# Patient Record
Sex: MALE | Race: WHITE | NOT HISPANIC OR LATINO | Employment: OTHER | URBAN - METROPOLITAN AREA
[De-identification: names, ages, dates, MRNs, and addresses within clinical notes are randomized per-mention and may not be internally consistent; named-entity substitution may affect disease eponyms.]

---

## 2023-11-21 ENCOUNTER — APPOINTMENT (OUTPATIENT)
Dept: CARDIOLOGY | Facility: HOSPITAL | Age: 73
End: 2023-11-21
Payer: MEDICARE

## 2023-11-21 ENCOUNTER — HOSPITAL ENCOUNTER (OUTPATIENT)
Facility: HOSPITAL | Age: 73
Setting detail: OBSERVATION
LOS: 1 days | Discharge: HOME | End: 2023-11-22
Attending: EMERGENCY MEDICINE | Admitting: STUDENT IN AN ORGANIZED HEALTH CARE EDUCATION/TRAINING PROGRAM
Payer: MEDICARE

## 2023-11-21 ENCOUNTER — APPOINTMENT (OUTPATIENT)
Dept: RADIOLOGY | Facility: HOSPITAL | Age: 73
End: 2023-11-21
Payer: MEDICARE

## 2023-11-21 DIAGNOSIS — K92.2 GI BLEED DUE TO NSAIDS: ICD-10-CM

## 2023-11-21 DIAGNOSIS — K92.2 ACUTE UPPER GI BLEED: Primary | ICD-10-CM

## 2023-11-21 DIAGNOSIS — T39.395A GI BLEED DUE TO NSAIDS: ICD-10-CM

## 2023-11-21 LAB
ALBUMIN SERPL BCP-MCNC: 4 G/DL (ref 3.4–5)
ALP SERPL-CCNC: 53 U/L (ref 33–136)
ALT SERPL W P-5'-P-CCNC: 47 U/L (ref 10–52)
ANION GAP SERPL CALC-SCNC: 13 MMOL/L (ref 10–20)
AST SERPL W P-5'-P-CCNC: 29 U/L (ref 9–39)
BASOPHILS # BLD AUTO: 0.02 X10*3/UL (ref 0–0.1)
BASOPHILS NFR BLD AUTO: 0.1 %
BILIRUB DIRECT SERPL-MCNC: 0.1 MG/DL (ref 0–0.3)
BILIRUB SERPL-MCNC: 0.7 MG/DL (ref 0–1.2)
BUN SERPL-MCNC: 27 MG/DL (ref 6–23)
CALCIUM SERPL-MCNC: 8.8 MG/DL (ref 8.6–10.3)
CHLORIDE SERPL-SCNC: 103 MMOL/L (ref 98–107)
CO2 SERPL-SCNC: 23 MMOL/L (ref 21–32)
CREAT SERPL-MCNC: 1.13 MG/DL (ref 0.5–1.3)
EOSINOPHIL # BLD AUTO: 0.13 X10*3/UL (ref 0–0.4)
EOSINOPHIL NFR BLD AUTO: 0.9 %
ERYTHROCYTE [DISTWIDTH] IN BLOOD BY AUTOMATED COUNT: 12.9 % (ref 11.5–14.5)
GFR SERPL CREATININE-BSD FRML MDRD: 68 ML/MIN/1.73M*2
GLUCOSE SERPL-MCNC: 131 MG/DL (ref 74–99)
HCT VFR BLD AUTO: 50.2 % (ref 41–52)
HGB BLD-MCNC: 17.3 G/DL (ref 13.5–17.5)
IMM GRANULOCYTES # BLD AUTO: 0.05 X10*3/UL (ref 0–0.5)
IMM GRANULOCYTES NFR BLD AUTO: 0.4 % (ref 0–0.9)
LACTATE SERPL-SCNC: 1.2 MMOL/L (ref 0.4–2)
LIPASE SERPL-CCNC: 35 U/L (ref 9–82)
LYMPHOCYTES # BLD AUTO: 0.52 X10*3/UL (ref 0.8–3)
LYMPHOCYTES NFR BLD AUTO: 3.7 %
MCH RBC QN AUTO: 32.3 PG (ref 26–34)
MCHC RBC AUTO-ENTMCNC: 34.5 G/DL (ref 32–36)
MCV RBC AUTO: 94 FL (ref 80–100)
MONOCYTES # BLD AUTO: 0.78 X10*3/UL (ref 0.05–0.8)
MONOCYTES NFR BLD AUTO: 5.5 %
NEUTROPHILS # BLD AUTO: 12.56 X10*3/UL (ref 1.6–5.5)
NEUTROPHILS NFR BLD AUTO: 89.4 %
NRBC BLD-RTO: 0 /100 WBCS (ref 0–0)
PLATELET # BLD AUTO: 267 X10*3/UL (ref 150–450)
POTASSIUM SERPL-SCNC: 4.1 MMOL/L (ref 3.5–5.3)
PROT SERPL-MCNC: 7.3 G/DL (ref 6.4–8.2)
RBC # BLD AUTO: 5.35 X10*6/UL (ref 4.5–5.9)
SODIUM SERPL-SCNC: 135 MMOL/L (ref 136–145)
WBC # BLD AUTO: 14.1 X10*3/UL (ref 4.4–11.3)

## 2023-11-21 PROCEDURE — 2550000001 HC RX 255 CONTRASTS: Performed by: EMERGENCY MEDICINE

## 2023-11-21 PROCEDURE — 93005 ELECTROCARDIOGRAM TRACING: CPT

## 2023-11-21 PROCEDURE — 80053 COMPREHEN METABOLIC PANEL: CPT | Performed by: EMERGENCY MEDICINE

## 2023-11-21 PROCEDURE — 85025 COMPLETE CBC W/AUTO DIFF WBC: CPT | Performed by: EMERGENCY MEDICINE

## 2023-11-21 PROCEDURE — 82248 BILIRUBIN DIRECT: CPT | Performed by: EMERGENCY MEDICINE

## 2023-11-21 PROCEDURE — 94760 N-INVAS EAR/PLS OXIMETRY 1: CPT

## 2023-11-21 PROCEDURE — 99222 1ST HOSP IP/OBS MODERATE 55: CPT | Performed by: STUDENT IN AN ORGANIZED HEALTH CARE EDUCATION/TRAINING PROGRAM

## 2023-11-21 PROCEDURE — 99285 EMERGENCY DEPT VISIT HI MDM: CPT | Mod: 25 | Performed by: EMERGENCY MEDICINE

## 2023-11-21 PROCEDURE — 96361 HYDRATE IV INFUSION ADD-ON: CPT | Performed by: STUDENT IN AN ORGANIZED HEALTH CARE EDUCATION/TRAINING PROGRAM

## 2023-11-21 PROCEDURE — 74174 CTA ABD&PLVS W/CONTRAST: CPT

## 2023-11-21 PROCEDURE — C9113 INJ PANTOPRAZOLE SODIUM, VIA: HCPCS | Performed by: EMERGENCY MEDICINE

## 2023-11-21 PROCEDURE — 1100000001 HC PRIVATE ROOM DAILY

## 2023-11-21 PROCEDURE — 83690 ASSAY OF LIPASE: CPT | Performed by: EMERGENCY MEDICINE

## 2023-11-21 PROCEDURE — 36415 COLL VENOUS BLD VENIPUNCTURE: CPT | Performed by: EMERGENCY MEDICINE

## 2023-11-21 PROCEDURE — 96374 THER/PROPH/DIAG INJ IV PUSH: CPT | Mod: 59 | Performed by: STUDENT IN AN ORGANIZED HEALTH CARE EDUCATION/TRAINING PROGRAM

## 2023-11-21 PROCEDURE — 2500000004 HC RX 250 GENERAL PHARMACY W/ HCPCS (ALT 636 FOR OP/ED): Performed by: EMERGENCY MEDICINE

## 2023-11-21 PROCEDURE — 74174 CTA ABD&PLVS W/CONTRAST: CPT | Performed by: RADIOLOGY

## 2023-11-21 PROCEDURE — 83605 ASSAY OF LACTIC ACID: CPT | Performed by: EMERGENCY MEDICINE

## 2023-11-21 RX ORDER — LEVOTHYROXINE SODIUM 88 UG/1
88 TABLET ORAL
COMMUNITY
Start: 2023-10-03

## 2023-11-21 RX ORDER — PANTOPRAZOLE SODIUM 40 MG/10ML
40 INJECTION, POWDER, LYOPHILIZED, FOR SOLUTION INTRAVENOUS 2 TIMES DAILY
Status: DISCONTINUED | OUTPATIENT
Start: 2023-11-22 | End: 2023-11-22

## 2023-11-21 RX ORDER — CHOLECALCIFEROL (VITAMIN D3) 25 MCG
1000 TABLET ORAL
Status: DISCONTINUED | OUTPATIENT
Start: 2023-11-22 | End: 2023-11-22 | Stop reason: HOSPADM

## 2023-11-21 RX ORDER — LISINOPRIL 10 MG/1
10 TABLET ORAL DAILY
Status: DISCONTINUED | OUTPATIENT
Start: 2023-11-22 | End: 2023-11-22 | Stop reason: HOSPADM

## 2023-11-21 RX ORDER — VIT C/E/ZN/COPPR/LUTEIN/ZEAXAN 250MG-90MG
1000 CAPSULE ORAL
COMMUNITY

## 2023-11-21 RX ORDER — SODIUM CHLORIDE, SODIUM LACTATE, POTASSIUM CHLORIDE, CALCIUM CHLORIDE 600; 310; 30; 20 MG/100ML; MG/100ML; MG/100ML; MG/100ML
75 INJECTION, SOLUTION INTRAVENOUS CONTINUOUS
Status: DISCONTINUED | OUTPATIENT
Start: 2023-11-21 | End: 2023-11-22 | Stop reason: HOSPADM

## 2023-11-21 RX ORDER — PANTOPRAZOLE SODIUM 40 MG/10ML
80 INJECTION, POWDER, LYOPHILIZED, FOR SOLUTION INTRAVENOUS ONCE
Status: COMPLETED | OUTPATIENT
Start: 2023-11-21 | End: 2023-11-21

## 2023-11-21 RX ORDER — LANSOPRAZOLE 30 MG/1
1 CAPSULE, DELAYED RELEASE ORAL DAILY
COMMUNITY
Start: 2023-01-17 | End: 2023-11-22 | Stop reason: HOSPADM

## 2023-11-21 RX ORDER — ONDANSETRON HYDROCHLORIDE 2 MG/ML
4 INJECTION, SOLUTION INTRAVENOUS ONCE
Status: COMPLETED | OUTPATIENT
Start: 2023-11-21 | End: 2023-11-21

## 2023-11-21 RX ORDER — BACLOFEN 20 MG
1 TABLET ORAL NIGHTLY
COMMUNITY
Start: 2019-10-24

## 2023-11-21 RX ORDER — ASPIRIN 81 MG/1
81 TABLET ORAL 2 TIMES DAILY
COMMUNITY
Start: 2019-01-04

## 2023-11-21 RX ORDER — RAMIPRIL 5 MG/1
5 CAPSULE ORAL DAILY
COMMUNITY
Start: 2023-09-01 | End: 2023-11-30

## 2023-11-21 RX ORDER — SERTRALINE HYDROCHLORIDE 100 MG/1
1 TABLET, FILM COATED ORAL NIGHTLY
COMMUNITY
Start: 2022-11-28

## 2023-11-21 RX ORDER — LANOLIN ALCOHOL/MO/W.PET/CERES
400 CREAM (GRAM) TOPICAL NIGHTLY
Status: DISCONTINUED | OUTPATIENT
Start: 2023-11-21 | End: 2023-11-22 | Stop reason: HOSPADM

## 2023-11-21 RX ORDER — TAMSULOSIN HYDROCHLORIDE 0.4 MG/1
1 CAPSULE ORAL NIGHTLY
COMMUNITY
Start: 2023-10-03 | End: 2024-10-02

## 2023-11-21 RX ORDER — ACETAMINOPHEN 325 MG/1
650 TABLET ORAL EVERY 6 HOURS PRN
Status: DISCONTINUED | OUTPATIENT
Start: 2023-11-21 | End: 2023-11-22 | Stop reason: HOSPADM

## 2023-11-21 RX ORDER — ONDANSETRON 4 MG/1
4 TABLET, FILM COATED ORAL EVERY 8 HOURS PRN
Status: DISCONTINUED | OUTPATIENT
Start: 2023-11-21 | End: 2023-11-22 | Stop reason: HOSPADM

## 2023-11-21 RX ORDER — ONDANSETRON HYDROCHLORIDE 2 MG/ML
4 INJECTION, SOLUTION INTRAVENOUS EVERY 8 HOURS PRN
Status: DISCONTINUED | OUTPATIENT
Start: 2023-11-21 | End: 2023-11-22 | Stop reason: HOSPADM

## 2023-11-21 RX ORDER — SERTRALINE HYDROCHLORIDE 50 MG/1
100 TABLET, FILM COATED ORAL NIGHTLY
Status: DISCONTINUED | OUTPATIENT
Start: 2023-11-21 | End: 2023-11-22 | Stop reason: HOSPADM

## 2023-11-21 RX ORDER — LEVOTHYROXINE SODIUM 88 UG/1
88 TABLET ORAL
Status: DISCONTINUED | OUTPATIENT
Start: 2023-11-22 | End: 2023-11-22 | Stop reason: HOSPADM

## 2023-11-21 RX ORDER — TAMSULOSIN HYDROCHLORIDE 0.4 MG/1
0.4 CAPSULE ORAL NIGHTLY
Status: DISCONTINUED | OUTPATIENT
Start: 2023-11-21 | End: 2023-11-22 | Stop reason: HOSPADM

## 2023-11-21 RX ADMIN — ONDANSETRON 4 MG: 2 INJECTION INTRAMUSCULAR; INTRAVENOUS at 15:17

## 2023-11-21 RX ADMIN — IOHEXOL 100 ML: 350 INJECTION, SOLUTION INTRAVENOUS at 16:15

## 2023-11-21 RX ADMIN — SODIUM CHLORIDE 1000 ML: 9 INJECTION, SOLUTION INTRAVENOUS at 15:19

## 2023-11-21 RX ADMIN — PANTOPRAZOLE SODIUM 80 MG: 40 INJECTION, POWDER, FOR SOLUTION INTRAVENOUS at 15:17

## 2023-11-21 SDOH — SOCIAL STABILITY: SOCIAL INSECURITY: DOES ANYONE TRY TO KEEP YOU FROM HAVING/CONTACTING OTHER FRIENDS OR DOING THINGS OUTSIDE YOUR HOME?: NO

## 2023-11-21 SDOH — SOCIAL STABILITY: SOCIAL INSECURITY: DO YOU FEEL ANYONE HAS EXPLOITED OR TAKEN ADVANTAGE OF YOU FINANCIALLY OR OF YOUR PERSONAL PROPERTY?: NO

## 2023-11-21 SDOH — SOCIAL STABILITY: SOCIAL INSECURITY: DO YOU FEEL UNSAFE GOING BACK TO THE PLACE WHERE YOU ARE LIVING?: NO

## 2023-11-21 SDOH — SOCIAL STABILITY: SOCIAL INSECURITY: HAVE YOU HAD THOUGHTS OF HARMING ANYONE ELSE?: NO

## 2023-11-21 SDOH — SOCIAL STABILITY: SOCIAL INSECURITY: HAS ANYONE EVER THREATENED TO HURT YOUR FAMILY OR YOUR PETS?: NO

## 2023-11-21 SDOH — SOCIAL STABILITY: SOCIAL INSECURITY: ARE YOU OR HAVE YOU BEEN THREATENED OR ABUSED PHYSICALLY, EMOTIONALLY, OR SEXUALLY BY ANYONE?: NO

## 2023-11-21 SDOH — SOCIAL STABILITY: SOCIAL INSECURITY: ABUSE: ADULT

## 2023-11-21 SDOH — SOCIAL STABILITY: SOCIAL INSECURITY: WERE YOU ABLE TO COMPLETE ALL THE BEHAVIORAL HEALTH SCREENINGS?: YES

## 2023-11-21 SDOH — SOCIAL STABILITY: SOCIAL INSECURITY: ARE THERE ANY APPARENT SIGNS OF INJURIES/BEHAVIORS THAT COULD BE RELATED TO ABUSE/NEGLECT?: NO

## 2023-11-21 ASSESSMENT — COGNITIVE AND FUNCTIONAL STATUS - GENERAL
MOBILITY SCORE: 24
PATIENT BASELINE BEDBOUND: NO
DAILY ACTIVITIY SCORE: 24

## 2023-11-21 ASSESSMENT — LIFESTYLE VARIABLES
EVER HAD A DRINK FIRST THING IN THE MORNING TO STEADY YOUR NERVES TO GET RID OF A HANGOVER: NO
HOW OFTEN DO YOU HAVE A DRINK CONTAINING ALCOHOL: NEVER
REASON UNABLE TO ASSESS: NO
HOW OFTEN DO YOU HAVE 6 OR MORE DRINKS ON ONE OCCASION: NEVER
AUDIT-C TOTAL SCORE: 0
SUBSTANCE_ABUSE_PAST_12_MONTHS: NO
EVER FELT BAD OR GUILTY ABOUT YOUR DRINKING: NO
HAVE YOU EVER FELT YOU SHOULD CUT DOWN ON YOUR DRINKING: NO
PRESCIPTION_ABUSE_PAST_12_MONTHS: NO
AUDIT-C TOTAL SCORE: 0
HAVE PEOPLE ANNOYED YOU BY CRITICIZING YOUR DRINKING: NO
SKIP TO QUESTIONS 9-10: 1
HOW MANY STANDARD DRINKS CONTAINING ALCOHOL DO YOU HAVE ON A TYPICAL DAY: PATIENT DOES NOT DRINK

## 2023-11-21 ASSESSMENT — ACTIVITIES OF DAILY LIVING (ADL)
GROOMING: INDEPENDENT
WALKS IN HOME: INDEPENDENT
JUDGMENT_ADEQUATE_SAFELY_COMPLETE_DAILY_ACTIVITIES: YES
LACK_OF_TRANSPORTATION: NO
FEEDING YOURSELF: INDEPENDENT
HEARING - RIGHT EAR: HEARING AID
PATIENT'S MEMORY ADEQUATE TO SAFELY COMPLETE DAILY ACTIVITIES?: YES
TOILETING: INDEPENDENT
HEARING - LEFT EAR: HEARING AID
BATHING: INDEPENDENT
ADEQUATE_TO_COMPLETE_ADL: YES
DRESSING YOURSELF: INDEPENDENT

## 2023-11-21 ASSESSMENT — COLUMBIA-SUICIDE SEVERITY RATING SCALE - C-SSRS
1. IN THE PAST MONTH, HAVE YOU WISHED YOU WERE DEAD OR WISHED YOU COULD GO TO SLEEP AND NOT WAKE UP?: NO
6. HAVE YOU EVER DONE ANYTHING, STARTED TO DO ANYTHING, OR PREPARED TO DO ANYTHING TO END YOUR LIFE?: NO
2. HAVE YOU ACTUALLY HAD ANY THOUGHTS OF KILLING YOURSELF?: NO

## 2023-11-21 ASSESSMENT — PAIN SCALES - GENERAL: PAINLEVEL_OUTOF10: 0 - NO PAIN

## 2023-11-21 ASSESSMENT — PATIENT HEALTH QUESTIONNAIRE - PHQ9
SUM OF ALL RESPONSES TO PHQ9 QUESTIONS 1 & 2: 0
1. LITTLE INTEREST OR PLEASURE IN DOING THINGS: NOT AT ALL
2. FEELING DOWN, DEPRESSED OR HOPELESS: NOT AT ALL

## 2023-11-21 ASSESSMENT — PAIN - FUNCTIONAL ASSESSMENT: PAIN_FUNCTIONAL_ASSESSMENT: 0-10

## 2023-11-21 NOTE — ED PROVIDER NOTES
HPI   Chief Complaint   Patient presents with    Abdominal Pain     Pt report very dark stools and vomiting dark vomit x1 times       Patient is a 73-year-old male with history of hypertension on baby aspirin comes in with episodes of black tarry stools last night about 12-14 of them and then he said getting dizzy nauseous he vomited coffee grounds and comes in complaining of some abdominal discomfort.  No fever no chills.                          No data recorded                Patient History   No past medical history on file.  No past surgical history on file.  No family history on file.  Social History     Tobacco Use    Smoking status: Not on file    Smokeless tobacco: Not on file   Substance Use Topics    Alcohol use: Not on file    Drug use: Not on file       Physical Exam   ED Triage Vitals [11/21/23 1412]   Temp Heart Rate Resp BP   36.8 °C (98.2 °F) 93 -- (!) 176/95      SpO2 Temp src Heart Rate Source Patient Position   94 % -- -- --      BP Location FiO2 (%)     -- --       Physical Exam  Vitals and nursing note reviewed. Exam conducted with a chaperone present.   HENT:      Head: Normocephalic.   Eyes:      Extraocular Movements: Extraocular movements intact.   Abdominal:      Palpations: Abdomen is soft.      Tenderness: There is abdominal tenderness in the epigastric area.   Genitourinary:     Rectum: Guaiac result positive.      Comments: Black dark stool strongly guaiac positive chaperone nurse at the bedside  Neurological:      General: No focal deficit present.      Mental Status: He is alert.         ED Course & MDM   Diagnoses as of 11/21/23 3097   Acute upper GI bleed       Medical Decision Making  My differential diagnosis  acute GI bleed, acute peptic ulcer disease, acute gastritis  I ordered a CBC chemistries lipase CT scan of the abdomen pelvis to further work-up and management of the patient and also order IV Protonix and IV Zofran for the patient.  Further work-up and management be done  based upon the results of the test ordered  Labs Reviewed  CBC WITH AUTO DIFFERENTIAL - Abnormal     WBC                           14.1 (*)               nRBC                          0.0                    RBC                           5.35                   Hemoglobin                    17.3                   Hematocrit                    50.2                   MCV                           94                     MCH                           32.3                   MCHC                          34.5                   RDW                           12.9                   Platelets                     267                    Neutrophils %                 89.4                   Immature Granulocytes %, Automated   0.4                    Lymphocytes %                 3.7                    Monocytes %                   5.5                    Eosinophils %                 0.9                    Basophils %                   0.1                    Neutrophils Absolute          12.56 (*)               Immature Granulocytes Absolute, Au*   0.05                   Lymphocytes Absolute          0.52 (*)               Monocytes Absolute            0.78                   Eosinophils Absolute          0.13                   Basophils Absolute            0.02                BASIC METABOLIC PANEL - Abnormal     Glucose                       131 (*)                Sodium                        135 (*)                Potassium                     4.1                    Chloride                      103                    Bicarbonate                   23                     Anion Gap                     13                     Urea Nitrogen                 27 (*)                 Creatinine                    1.13                   eGFR                          68                     Calcium                       8.8                 LIPASE - Normal     Lipase                        35                       Narrative: Venipuncture  immediately after or during the administration of Metamizole may lead to falsely low results. Testing should be performed immediately prior to Metamizole dosing.  HEPATIC FUNCTION PANEL - Normal     Albumin                       4.0                    Bilirubin, Total              0.7                    Bilirubin, Direct             0.1                    Alkaline Phosphatase          53                     ALT                           47                     AST                           29                     Total Protein                 7.3                 LACTATE - Normal     CT angio abdomen pelvis w and or wo IV IV contrast    (Results Pending)      At this time patient CT scan is pending following which further disposition will be made by the incoming provider who has been signed out to the patient.        Procedure  ECG 12 lead    Performed by: Deni Wheat MD  Authorized by: Deni Wheat MD    ECG interpreted by ED Physician in the absence of a cardiologist: yes    Rate:     ECG rate:  81  Rhythm:     Rhythm: sinus rhythm    QRS:     QRS conduction: normal    ST segments:     ST segments:  Normal       Deni Wheat MD  11/21/23 6921

## 2023-11-22 ENCOUNTER — ANESTHESIA (OUTPATIENT)
Dept: GASTROENTEROLOGY | Facility: HOSPITAL | Age: 73
End: 2023-11-22
Payer: MEDICARE

## 2023-11-22 ENCOUNTER — APPOINTMENT (OUTPATIENT)
Dept: GASTROENTEROLOGY | Facility: HOSPITAL | Age: 73
End: 2023-11-22
Payer: MEDICARE

## 2023-11-22 ENCOUNTER — ANESTHESIA EVENT (OUTPATIENT)
Dept: GASTROENTEROLOGY | Facility: HOSPITAL | Age: 73
End: 2023-11-22
Payer: MEDICARE

## 2023-11-22 VITALS
TEMPERATURE: 96.4 F | SYSTOLIC BLOOD PRESSURE: 104 MMHG | WEIGHT: 241.62 LBS | RESPIRATION RATE: 18 BRPM | OXYGEN SATURATION: 93 % | HEART RATE: 56 BPM | DIASTOLIC BLOOD PRESSURE: 57 MMHG | BODY MASS INDEX: 32.73 KG/M2 | HEIGHT: 72 IN

## 2023-11-22 PROBLEM — I10 HTN (HYPERTENSION): Status: ACTIVE | Noted: 2023-11-22

## 2023-11-22 PROBLEM — F41.9 ANXIETY: Status: ACTIVE | Noted: 2023-11-22

## 2023-11-22 PROBLEM — E66.9 CLASS 1 OBESITY IN ADULT: Status: ACTIVE | Noted: 2023-11-22

## 2023-11-22 PROBLEM — E03.9 HYPOTHYROIDISM: Status: ACTIVE | Noted: 2023-11-22

## 2023-11-22 LAB
ANION GAP SERPL CALC-SCNC: 11 MMOL/L (ref 10–20)
BUN SERPL-MCNC: 22 MG/DL (ref 6–23)
CALCIUM SERPL-MCNC: 8.1 MG/DL (ref 8.6–10.3)
CHLORIDE SERPL-SCNC: 106 MMOL/L (ref 98–107)
CO2 SERPL-SCNC: 26 MMOL/L (ref 21–32)
CREAT SERPL-MCNC: 1.09 MG/DL (ref 0.5–1.3)
ERYTHROCYTE [DISTWIDTH] IN BLOOD BY AUTOMATED COUNT: 13 % (ref 11.5–14.5)
GFR SERPL CREATININE-BSD FRML MDRD: 71 ML/MIN/1.73M*2
GLUCOSE SERPL-MCNC: 110 MG/DL (ref 74–99)
HCT VFR BLD AUTO: 45.4 % (ref 41–52)
HGB BLD-MCNC: 15.4 G/DL (ref 13.5–17.5)
MCH RBC QN AUTO: 32.6 PG (ref 26–34)
MCHC RBC AUTO-ENTMCNC: 33.9 G/DL (ref 32–36)
MCV RBC AUTO: 96 FL (ref 80–100)
NRBC BLD-RTO: 0 /100 WBCS (ref 0–0)
PLATELET # BLD AUTO: 213 X10*3/UL (ref 150–450)
POTASSIUM SERPL-SCNC: 3.8 MMOL/L (ref 3.5–5.3)
RBC # BLD AUTO: 4.72 X10*6/UL (ref 4.5–5.9)
SODIUM SERPL-SCNC: 139 MMOL/L (ref 136–145)
WBC # BLD AUTO: 9.5 X10*3/UL (ref 4.4–11.3)

## 2023-11-22 PROCEDURE — 96376 TX/PRO/DX INJ SAME DRUG ADON: CPT | Mod: 59 | Performed by: STUDENT IN AN ORGANIZED HEALTH CARE EDUCATION/TRAINING PROGRAM

## 2023-11-22 PROCEDURE — 2500000004 HC RX 250 GENERAL PHARMACY W/ HCPCS (ALT 636 FOR OP/ED): Performed by: STUDENT IN AN ORGANIZED HEALTH CARE EDUCATION/TRAINING PROGRAM

## 2023-11-22 PROCEDURE — C9113 INJ PANTOPRAZOLE SODIUM, VIA: HCPCS | Performed by: STUDENT IN AN ORGANIZED HEALTH CARE EDUCATION/TRAINING PROGRAM

## 2023-11-22 PROCEDURE — G0378 HOSPITAL OBSERVATION PER HR: HCPCS

## 2023-11-22 PROCEDURE — 88305 TISSUE EXAM BY PATHOLOGIST: CPT | Performed by: PATHOLOGY

## 2023-11-22 PROCEDURE — 36415 COLL VENOUS BLD VENIPUNCTURE: CPT | Performed by: STUDENT IN AN ORGANIZED HEALTH CARE EDUCATION/TRAINING PROGRAM

## 2023-11-22 PROCEDURE — 2500000001 HC RX 250 WO HCPCS SELF ADMINISTERED DRUGS (ALT 637 FOR MEDICARE OP): Performed by: STUDENT IN AN ORGANIZED HEALTH CARE EDUCATION/TRAINING PROGRAM

## 2023-11-22 PROCEDURE — 88305 TISSUE EXAM BY PATHOLOGIST: CPT | Mod: TC,SUR,ELYLAB | Performed by: STUDENT IN AN ORGANIZED HEALTH CARE EDUCATION/TRAINING PROGRAM

## 2023-11-22 PROCEDURE — 43239 EGD BIOPSY SINGLE/MULTIPLE: CPT | Performed by: STUDENT IN AN ORGANIZED HEALTH CARE EDUCATION/TRAINING PROGRAM

## 2023-11-22 PROCEDURE — 3700000001 HC GENERAL ANESTHESIA TIME - INITIAL BASE CHARGE

## 2023-11-22 PROCEDURE — 3700000002 HC GENERAL ANESTHESIA TIME - EACH INCREMENTAL 1 MINUTE

## 2023-11-22 PROCEDURE — 2500000002 HC RX 250 W HCPCS SELF ADMINISTERED DRUGS (ALT 637 FOR MEDICARE OP, ALT 636 FOR OP/ED): Performed by: STUDENT IN AN ORGANIZED HEALTH CARE EDUCATION/TRAINING PROGRAM

## 2023-11-22 PROCEDURE — 85027 COMPLETE CBC AUTOMATED: CPT | Performed by: STUDENT IN AN ORGANIZED HEALTH CARE EDUCATION/TRAINING PROGRAM

## 2023-11-22 PROCEDURE — 80048 BASIC METABOLIC PNL TOTAL CA: CPT | Performed by: STUDENT IN AN ORGANIZED HEALTH CARE EDUCATION/TRAINING PROGRAM

## 2023-11-22 PROCEDURE — 2500000004 HC RX 250 GENERAL PHARMACY W/ HCPCS (ALT 636 FOR OP/ED): Performed by: ANESTHESIOLOGY

## 2023-11-22 PROCEDURE — 88305 TISSUE EXAM BY PATHOLOGIST: CPT | Mod: TC,ELYLAB | Performed by: STUDENT IN AN ORGANIZED HEALTH CARE EDUCATION/TRAINING PROGRAM

## 2023-11-22 PROCEDURE — 99239 HOSP IP/OBS DSCHRG MGMT >30: CPT | Performed by: STUDENT IN AN ORGANIZED HEALTH CARE EDUCATION/TRAINING PROGRAM

## 2023-11-22 RX ORDER — PANTOPRAZOLE SODIUM 40 MG/1
40 TABLET, DELAYED RELEASE ORAL
Qty: 60 TABLET | Refills: 0 | Status: SHIPPED | OUTPATIENT
Start: 2023-11-22 | End: 2023-12-22

## 2023-11-22 RX ORDER — PANTOPRAZOLE SODIUM 40 MG/1
40 TABLET, DELAYED RELEASE ORAL
Status: DISCONTINUED | OUTPATIENT
Start: 2023-11-22 | End: 2023-11-22 | Stop reason: HOSPADM

## 2023-11-22 RX ORDER — PROPOFOL 10 MG/ML
INJECTION, EMULSION INTRAVENOUS AS NEEDED
Status: DISCONTINUED | OUTPATIENT
Start: 2023-11-22 | End: 2023-11-22

## 2023-11-22 RX ADMIN — SERTRALINE HYDROCHLORIDE 100 MG: 50 TABLET, FILM COATED ORAL at 00:18

## 2023-11-22 RX ADMIN — TAMSULOSIN HYDROCHLORIDE 0.4 MG: 0.4 CAPSULE ORAL at 00:17

## 2023-11-22 RX ADMIN — Medication 1000 UNITS: at 06:21

## 2023-11-22 RX ADMIN — LISINOPRIL 10 MG: 10 TABLET ORAL at 09:17

## 2023-11-22 RX ADMIN — SODIUM CHLORIDE, POTASSIUM CHLORIDE, SODIUM LACTATE AND CALCIUM CHLORIDE 75 ML/HR: 600; 310; 30; 20 INJECTION, SOLUTION INTRAVENOUS at 00:16

## 2023-11-22 RX ADMIN — PROPOFOL 80 MG: 10 INJECTION, EMULSION INTRAVENOUS at 14:32

## 2023-11-22 RX ADMIN — LEVOTHYROXINE SODIUM 88 MCG: 0.09 TABLET ORAL at 06:21

## 2023-11-22 RX ADMIN — PANTOPRAZOLE SODIUM 40 MG: 40 INJECTION, POWDER, FOR SOLUTION INTRAVENOUS at 09:18

## 2023-11-22 SDOH — HEALTH STABILITY: MENTAL HEALTH: CURRENT SMOKER: 0

## 2023-11-22 ASSESSMENT — ENCOUNTER SYMPTOMS
DIARRHEA: 1
NAUSEA: 1
ABDOMINAL PAIN: 1

## 2023-11-22 ASSESSMENT — PAIN - FUNCTIONAL ASSESSMENT: PAIN_FUNCTIONAL_ASSESSMENT: 0-10

## 2023-11-22 ASSESSMENT — PAIN SCALES - GENERAL
PAINLEVEL_OUTOF10: 0 - NO PAIN
PAIN_LEVEL: 0

## 2023-11-22 NOTE — NURSING NOTE
Huddle and Timeout completed together with team. Patient wristband and ISAIAH information verified.

## 2023-11-22 NOTE — CONSULTS
Department of Internal Medicine  Gastroenterology  Consult note      Reason for Consult: Possible acute GIB    Chief Complaint: Diarrhea, possible melena and coffee-ground emesis    History Obtained from: Patient prior medical records    History of Present Illness      The patient is a 73 y.o. male with signficant past medical history of anxiety, enlarged prostate and hypertension who presents for diarrhea, possible melena and coffee-ground emesis.    The patient reports having diarrhea described as loose or black stools 2 nights ago greater than 12 episodes with some dizziness.  Patient reports he took some Dramamine and after that had an episode of vomiting described as black/coffee-ground x 1.  Diarrhea has seemed to be improving with only 2 bowel movements yesterday noted to be normal in color and more solid per patient.  No further bowel movements overnight or today.  No further episodes of nausea or vomiting.  No abdominal pain.  No current dizziness.  No chest pain or shortness of breath.  Patient denies bright red blood per rectum.  Patient reports taking NSAIDs, ibuprofen, maybe a few times a week.  No history of liver disease.  Patient takes a baby ASA, no other anticoagulants at home.  Patient reports taking Prilosec with relief of reflux.    Patient reports that his wife was sick with similar symptoms a few days prior with diarrhea.  Patient was also recently on an airplane and reports eating fast food, abdominal cheeseburger and diet Coke as well prior to symptoms.    No prior EGD    Prior colonoscopy indicated for GIB about 9 years ago at a  facility and [facility] with unremarkable results reportedly per patient    Allergies  Codeine and Hydrocodone-acetaminophen    Current Medication    Current Facility-Administered Medications:     acetaminophen (Tylenol) tablet 650 mg, 650 mg, oral, q6h PRN, Victor M Hayes MD    cholecalciferol (Vitamin D-3) tablet 1,000 Units, 1,000 Units, oral, Daily, Victor M  MD Abigail, 1,000 Units at 11/22/23 0621    lactated Ringer's infusion, 75 mL/hr, intravenous, Continuous, Victor M Hayes MD, Last Rate: 75 mL/hr at 11/22/23 0240, 75 mL/hr at 11/22/23 0240    levothyroxine (Synthroid, Levoxyl) tablet 88 mcg, 88 mcg, oral, Daily before breakfast, Victor M Hayes MD, 88 mcg at 11/22/23 0621    lisinopril tablet 10 mg, 10 mg, oral, Daily, Victor M Hayes MD, 10 mg at 11/22/23 0917    magnesium oxide (Mag-Ox) tablet 400 mg, 400 mg, oral, Nightly, Victor M Hayes MD    ondansetron (Zofran) tablet 4 mg, 4 mg, oral, q8h PRN **OR** ondansetron (Zofran) injection 4 mg, 4 mg, intravenous, q8h PRN, Victor M Hayes MD    pantoprazole (ProtoNix) injection 40 mg, 40 mg, intravenous, BID, Victor M Hayes MD, 40 mg at 11/22/23 0918    sertraline (Zoloft) tablet 100 mg, 100 mg, oral, Nightly, Victor M Hayes MD, 100 mg at 11/22/23 0018    tamsulosin (Flomax) 24 hr capsule 0.4 mg, 0.4 mg, oral, Nightly, Victor M Hayes MD, 0.4 mg at 11/22/23 0017    Past Medical History  Active Ambulatory Problems     Diagnosis Date Noted    HTN (hypertension) 11/22/2023    Anxiety 11/22/2023    Hypothyroidism 11/22/2023    Class 1 obesity in adult 11/22/2023     Resolved Ambulatory Problems     Diagnosis Date Noted    No Resolved Ambulatory Problems     Past Medical History:   Diagnosis Date    Enlarged prostate     Hypertension        Past Surgical History  Past Surgical History:   Procedure Laterality Date    CT ABDOMEN PELVIS ANGIOGRAM W AND/OR WO IV CONTRAST  11/21/2023    CT ABDOMEN PELVIS ANGIOGRAM W AND/OR WO IV CONTRAST 11/21/2023 [facility]    KNEE SURGERY Left     miniscus tear    TOTAL HIP ARTHROPLASTY Right        Family History  No family history on file.  No family history of stomach or colon cancer    Social History  TOBACCO:  reports that he has never smoked. He has never used smokeless tobacco.  ETOH:  reports no history of alcohol use.  DRUGS:  reports no history of drug  "use.  MARITAL STATUS:   OCCUPATION:    Review of Systems  All 10 systems reviewed with the patient/family and negative except for what is mentioned in HPI      PHYSICAL EXAM  VS: /60   Pulse 72   Temp 36.3 °C (97.3 °F)   Resp 18   Ht 1.829 m (6' 0.01\")   Wt 110 kg (241 lb 10 oz)   SpO2 97%   BMI 32.76 kg/m²  Body mass index is 32.76 kg/m².  Physical Exam  Vitals reviewed.   Constitutional:       General: He is not in acute distress.     Appearance: Normal appearance.   HENT:      Head: Normocephalic.      Nose: Nose normal.      Mouth/Throat:      Mouth: Mucous membranes are moist.      Pharynx: Oropharynx is clear.   Eyes:      Extraocular Movements: Extraocular movements intact.      Conjunctiva/sclera: Conjunctivae normal.      Pupils: Pupils are equal, round, and reactive to light.   Cardiovascular:      Rate and Rhythm: Normal rate and regular rhythm.      Pulses: Normal pulses.      Heart sounds: Normal heart sounds.   Pulmonary:      Effort: Pulmonary effort is normal.      Breath sounds: Normal breath sounds.   Abdominal:      General: Abdomen is flat. Bowel sounds are normal. There is no distension.      Palpations: Abdomen is soft.      Tenderness: There is no abdominal tenderness. There is no guarding or rebound.      Comments: PAPA: No stool in the rectal vault   Musculoskeletal:         General: Normal range of motion.      Cervical back: Normal range of motion.   Skin:     General: Skin is warm and dry.   Neurological:      General: No focal deficit present.      Mental Status: He is alert and oriented to person, place, and time.   Psychiatric:         Mood and Affect: Mood normal.         Behavior: Behavior normal.          DATA  Recent blood work and relevant radiology and endoscopic studies were reviewed and discussed with the patient   Results from last 7 days   Lab Units 11/22/23  0448   WBC AUTO x10*3/uL 9.5   RBC AUTO x10*6/uL 4.72   HEMOGLOBIN g/dL 15.4   HEMATOCRIT % 45.4   MCV fL " 96   MCHC g/dL 33.9   RDW % 13.0   PLATELETS AUTO x10*3/uL 213       Results from last 72 hours   Lab Units 11/22/23  0448 11/21/23  1433   SODIUM mmol/L 139 135*   POTASSIUM mmol/L 3.8 4.1   CHLORIDE mmol/L 106 103   CO2 mmol/L 26 23   BUN mg/dL 22 27*   CREATININE mg/dL 1.09 1.13   CALCIUM mg/dL 8.1* 8.8   PROTEIN TOTAL g/dL  --  7.3   BILIRUBIN TOTAL mg/dL  --  0.7   ALK PHOS U/L  --  53   AST U/L  --  29   ALT U/L  --  47             Results from last 72 hours   Lab Units 11/21/23  1433   LIPASE U/L 35       RADIOLOGY REVIEW  CT abdomen pelvis angiogram 11/21/2023:  IMPRESSION:  No evidence of extravasation contrast in bowel lumen to suggest acute  active gastrointestinal bleed.      Diffuse fluid-filled colon compatible with diarrhea/colitis.      Colonic diverticulosis without evidence of acute diverticulitis.      Mild groundglass opacity in the mid abdominal mesentery and several  subcentimeter mesenteric lymph nodes which may be secondary to mild  mesenteric panniculitis or reactive to an inflammatory process of the  bowel.      Large hiatal hernia.    ENDOSCOPIC REVIEW  No prior EGD    Prior colonoscopy indicated for GIB about 9 years ago at a  facility and [facility] with unremarkable results reportedly per patient      IMPRESSION/RECOMMENDATIONS  The patient is a 73 y.o. male with signficant past medical history of anxiety, enlarged prostate and hypertension who presents for diarrhea, possible melena and coffee-ground emesis.    The patient reports having diarrhea described as loose or black stools 2 nights ago greater than 12 episodes with some dizziness.  Patient reports he took some Dramamine and after that had an episode of vomiting described as black/coffee-ground x 1.  Diarrhea has seemed to be improving with only 2 bowel movements yesterday noted to be normal in color and more solid per patient.  No further bowel movements overnight or today.  No further episodes of nausea or vomiting.  No  abdominal pain.  No current dizziness.  No chest pain or shortness of breath.  Patient denies bright red blood per rectum.  Patient reports taking NSAIDs, ibuprofen, maybe a few times a week.  No history of liver disease.  Patient takes a baby ASA, no other anticoagulants at home.  Patient reports taking Prilosec with relief of reflux.          Assessment  #Questionable GIB- ?Melena and coffee-ground emesis x1, with no further episodes.  No current anemia.  HDS.  Takes NSAIDs maybe a few times a week  #N/V/D -probably secondary to acute viral gastroenteritis that has seemed to subside with patient reporting he's back to his baseline.  Wife sick with similar symptoms.      Plan  -EGD today  -Keep n.p.o. prior to procedure  -Given PPI 80 mg bolus, continue PPI twice daily  -trend hgb and hct  -monitor consistency and color of stool. Monitor for signs of overt GI bleeding  -tranfuse for hgb < 7.0  -avoid NSAIDs  -Continue supportive care  -Consider checking for C. difficile and stool pathogen panel if diarrhea returns          Addendum  EGD today 11/22/2023:  Findings  The esophagus appeared normal.  10 cm hiatal hernia with Felipe lesions present. Hill grade IV hiatal hernia  Erythematous mucosa in the stomach; performed cold forceps biopsy to rule out H. pylori  The duodenum appeared normal.        Recommendation    Await pathology results     PPI BID.   Avoid NSAIDs.  If patient develops anemia or worsening bleeding, would place referral to Surgery for repair of hiatal hernia.  Antireflux lifestyle.       Plan discussed with Dr. Kramer.  GI will sign off  Total of 75 minutes spent on visit and overall personal care of the patient including patient education.  All questions answered  (Electronically signed byFRANCISCO Mortensen on 11/22/2023 at 12:45 PM)                           Inpatient consult to gastroenterology  Consult performed by: FRANCISCO Mortensen  Consult ordered by: Victor M Hayes  MD

## 2023-11-22 NOTE — PROGRESS NOTES
Emergency Medicine Transition of Care Note.    I received Ralf Garcia in signout from Dr. Wheat.  Please see the previous ED provider note for all HPI, PE and MDM up to the time of signout at 1700. This is in addition to the primary record.    In brief Ralf Garcia is an 73 y.o. male presenting for   Chief Complaint   Patient presents with    Abdominal Pain     Pt report very dark stools and vomiting dark vomit x1 times     At the time of signout we were awaiting: CT angio abdomen    Diagnoses as of 11/21/23 2036   Acute upper GI bleed       Medical Decision Making  Patient is an overall well-appearing 73-year-old male presents to the emergency department for dark stools.  Patient evaluated by the previous ED provider and confirmed for upper GI bleed.  Patient was treated for this with Protonix patient signed out to me pending CT angio.  Patient's labs were reviewed.  No anemia noted.  Patient's BUN is elevated without any previous labs for comparison.  Kidney function is at the upper end of normal.  Minor leukocytosis with hemoglobin of 14.1 noted.  No apparent infectious etiologies.  CT angio reviewed without any evidence of extravasation of contrast indicated a acute GI bleed.  Patient does have fluid-filled colon compatible with his complaints of diarrhea as he has had multiple episodes of dark stools.  No evidence of diverticulitis.  There is some enlarged subcentimeter mesenteric lymph nodes of undetermined significance as the patient on my evaluation had no abdominal discomfort.  Patient does admit that he utilizes 2 to 3 tablets of NSAIDs up to 4 times a day 2-3 times a week for general aches and pains.  I suspect that the patient has gastric ulcer secondary to NSAID use which is what is causing his upper GI bleed.  Hospitalist was contacted and the case discussed.  Patient was accepted for admission.  Patient family at bedside were advised of all of his laboratory and imaging findings and current  care plan and he is in agreement with this.  All questions answered.  Patient admitted without incident.  Patient remained stable while in the emergency department.    Labs Reviewed   CBC WITH AUTO DIFFERENTIAL - Abnormal       Result Value    WBC 14.1 (*)     nRBC 0.0      RBC 5.35      Hemoglobin 17.3      Hematocrit 50.2      MCV 94      MCH 32.3      MCHC 34.5      RDW 12.9      Platelets 267      Neutrophils % 89.4      Immature Granulocytes %, Automated 0.4      Lymphocytes % 3.7      Monocytes % 5.5      Eosinophils % 0.9      Basophils % 0.1      Neutrophils Absolute 12.56 (*)     Immature Granulocytes Absolute, Automated 0.05      Lymphocytes Absolute 0.52 (*)     Monocytes Absolute 0.78      Eosinophils Absolute 0.13      Basophils Absolute 0.02     BASIC METABOLIC PANEL - Abnormal    Glucose 131 (*)     Sodium 135 (*)     Potassium 4.1      Chloride 103      Bicarbonate 23      Anion Gap 13      Urea Nitrogen 27 (*)     Creatinine 1.13      eGFR 68      Calcium 8.8     LIPASE - Normal    Lipase 35      Narrative:     Venipuncture immediately after or during the administration of Metamizole may lead to falsely low results. Testing should be performed immediately prior to Metamizole dosing.   HEPATIC FUNCTION PANEL - Normal    Albumin 4.0      Bilirubin, Total 0.7      Bilirubin, Direct 0.1      Alkaline Phosphatase 53      ALT 47      AST 29      Total Protein 7.3     LACTATE - Normal    Lactate 1.2      Narrative:     Venipuncture immediately after or during the administration of Metamizole may lead to falsely low results. Testing should be performed immediately  prior to Metamizole dosing.     CT angio abdomen pelvis w and or wo IV IV contrast   Final Result   No evidence of extravasation contrast in bowel lumen to suggest acute   active gastrointestinal bleed.        Diffuse fluid-filled colon compatible with diarrhea/colitis.        Colonic diverticulosis without evidence of acute diverticulitis.         Mild groundglass opacity in the mid abdominal mesentery and several   subcentimeter mesenteric lymph nodes which may be secondary to mild   mesenteric panniculitis or reactive to an inflammatory process of the   bowel.        Large hiatal hernia.        MACRO:   None        Signed by: Nestor Azul 11/21/2023 7:11 PM   Dictation workstation:   [id number]          Final diagnoses:   [K92.2] Acute upper GI bleed       Procedure  Procedures    Vipin Mancilla DO

## 2023-11-22 NOTE — H&P
History Of Present Illness  Ralf Garcia is a 73 y.o. male presenting with diarrhea and black tarry stools for 1 day. Patient reports mild abdominal pain and several episodes of diarrhea. He states stool was black and tarry. Patient also reports nausea and 1 episode of dark vomitus. He denies similar prior episodes, and denies fever/chills, CP, SOB. Patient reports last colonoscopy approximately 9 years ago, and denies abnormal findings of previous scopes. Patient does report taking NSAIDs several times a week for back pain. In the ED, patient was afebrile and hemodynamically stable. Labs significant for mildly elevated BUN and leukocytosis. FOBT +. Patient received IVF and IV protonix. He reports last BM in the ED was brown.        Past Medical History  He has a past medical history of Anxiety, Enlarged prostate, and Hypertension.    Surgical History  He has a past surgical history that includes CT angio abdomen pelvis w and or wo IV IV contrast (11/21/2023); Total hip arthroplasty (Right); and Knee surgery (Left).     Social History  He reports that he has never smoked. He has never used smokeless tobacco. He reports that he does not drink alcohol and does not use drugs.    Family History  No family history on file.     Allergies  Codeine and Hydrocodone-acetaminophen    Review of Systems   Gastrointestinal:  Positive for abdominal pain, diarrhea and nausea.   All other systems reviewed and are negative.       Physical Exam  General Appearance: awake and alert, AAOx3, NAD  HEENT: nc/at, eomi, perrla, dry oral mucosa  Resp: ctab; no wheezing, rhonchi, or rales, normal respiratory effort  Cardio: rrr. S1s2  GI: soft, ntnd, BS+  Ext: no edema, 2+ pulses b/l     Last Recorded Vitals  /70   Pulse 75   Temp 36.4 °C (97.5 °F)   Resp 18   Wt 110 kg (241 lb 10 oz)   SpO2 92%     Relevant Results  Results for orders placed or performed during the hospital encounter of 11/21/23 (from the past 24 hour(s))   CBC  and Auto Differential   Result Value Ref Range    WBC 14.1 (H) 4.4 - 11.3 x10*3/uL    nRBC 0.0 0.0 - 0.0 /100 WBCs    RBC 5.35 4.50 - 5.90 x10*6/uL    Hemoglobin 17.3 13.5 - 17.5 g/dL    Hematocrit 50.2 41.0 - 52.0 %    MCV 94 80 - 100 fL    MCH 32.3 26.0 - 34.0 pg    MCHC 34.5 32.0 - 36.0 g/dL    RDW 12.9 11.5 - 14.5 %    Platelets 267 150 - 450 x10*3/uL    Neutrophils % 89.4 40.0 - 80.0 %    Immature Granulocytes %, Automated 0.4 0.0 - 0.9 %    Lymphocytes % 3.7 13.0 - 44.0 %    Monocytes % 5.5 2.0 - 10.0 %    Eosinophils % 0.9 0.0 - 6.0 %    Basophils % 0.1 0.0 - 2.0 %    Neutrophils Absolute 12.56 (H) 1.60 - 5.50 x10*3/uL    Immature Granulocytes Absolute, Automated 0.05 0.00 - 0.50 x10*3/uL    Lymphocytes Absolute 0.52 (L) 0.80 - 3.00 x10*3/uL    Monocytes Absolute 0.78 0.05 - 0.80 x10*3/uL    Eosinophils Absolute 0.13 0.00 - 0.40 x10*3/uL    Basophils Absolute 0.02 0.00 - 0.10 x10*3/uL   Basic metabolic panel   Result Value Ref Range    Glucose 131 (H) 74 - 99 mg/dL    Sodium 135 (L) 136 - 145 mmol/L    Potassium 4.1 3.5 - 5.3 mmol/L    Chloride 103 98 - 107 mmol/L    Bicarbonate 23 21 - 32 mmol/L    Anion Gap 13 10 - 20 mmol/L    Urea Nitrogen 27 (H) 6 - 23 mg/dL    Creatinine 1.13 0.50 - 1.30 mg/dL    eGFR 68 >60 mL/min/1.73m*2    Calcium 8.8 8.6 - 10.3 mg/dL   Lipase   Result Value Ref Range    Lipase 35 9 - 82 U/L   Hepatic function panel   Result Value Ref Range    Albumin 4.0 3.4 - 5.0 g/dL    Bilirubin, Total 0.7 0.0 - 1.2 mg/dL    Bilirubin, Direct 0.1 0.0 - 0.3 mg/dL    Alkaline Phosphatase 53 33 - 136 U/L    ALT 47 10 - 52 U/L    AST 29 9 - 39 U/L    Total Protein 7.3 6.4 - 8.2 g/dL   Lactate   Result Value Ref Range    Lactate 1.2 0.4 - 2.0 mmol/L   CBC   Result Value Ref Range    WBC 9.5 4.4 - 11.3 x10*3/uL    nRBC 0.0 0.0 - 0.0 /100 WBCs    RBC 4.72 4.50 - 5.90 x10*6/uL    Hemoglobin 15.4 13.5 - 17.5 g/dL    Hematocrit 45.4 41.0 - 52.0 %    MCV 96 80 - 100 fL    MCH 32.6 26.0 - 34.0 pg    MCHC  33.9 32.0 - 36.0 g/dL    RDW 13.0 11.5 - 14.5 %    Platelets 213 150 - 450 x10*3/uL     CT angio abdomen pelvis w and or wo IV IV contrast    Result Date: 11/21/2023  Interpreted By:  Nestor Azul,  and Giles Shelton STUDY: CT ANGIO ABDOMEN PELVIS W AND/OR WO IV IV CONTRAST;  11/21/2023 4:42 pm   INDICATION: Signs/Symptoms:gi bleed and abd pain.   COMPARISON: None.   ACCESSION NUMBER(S): [id number]   ORDERING CLINICIAN: ELAYNE VALDES   TECHNIQUE: Contiguous axial images of the abdomen and pelvis were obtained with and without intravenous contrast. Coronal and sagittal reformatted images were obtained from the axial images. MIPS and 3D reformatted images were also performed and reviewed.   FINDINGS: There is limited evaluation of the lung bases. Large hiatal hernia. Bibasilar subsegmental atelectasis.   There is hepatic steatosis. Two small subcentimeter hypodensities in the liver are too small to characterize. The gallbladder is present. No dilatation of the common bile duct.   The pancreas, spleen, and adrenal glands appear unremarkable.   Symmetric enhancement of the kidneys. 4 mm nonobstructive left renal calculus. Small right renal cysts and subcentimeter hypodensity left kidney too small to characterize. No hydronephrosis.   No evidence of abdominal aortic aneurysm or dissection. There is atherosclerotic calcification of the abdominal aorta and bilateral iliac arteries. The celiac artery, superior mesenteric artery, bilateral renal arteries, and the inferior mesenteric artery are grossly patent. No evidence of extravasation contrast the bowel lumen to suggest acute active gastrointestinal bleed.   There is mild groundglass opacity in the abdominal mesentery and small subcentimeter mesenteric lymph nodes.   No evidence of bowel obstruction. Diffuse fluid-filled colon. There is colonic diverticulosis without evidence of acute diverticulitis.   Urinary bladder is underdistended and not well evaluated.    Postsurgical change of right hip arthroplasty resulting in beam hardening artifact limiting evaluation. Advanced left hip osteoarthrosis.   Multilevel degenerative change of the lumbar spine.       No evidence of extravasation contrast in bowel lumen to suggest acute active gastrointestinal bleed.   Diffuse fluid-filled colon compatible with diarrhea/colitis.   Colonic diverticulosis without evidence of acute diverticulitis.   Mild groundglass opacity in the mid abdominal mesentery and several subcentimeter mesenteric lymph nodes which may be secondary to mild mesenteric panniculitis or reactive to an inflammatory process of the bowel.   Large hiatal hernia.   MACRO: None   Signed by: Nestor Azul 11/21/2023 7:11 PM Dictation workstation:   [id number]   Scheduled medications  cholecalciferol, 1,000 Units, oral, Daily  levothyroxine, 88 mcg, oral, Daily before breakfast  lisinopril, 10 mg, oral, Daily  magnesium oxide, 400 mg, oral, Nightly  pantoprazole, 40 mg, intravenous, BID  sertraline, 100 mg, oral, Nightly  tamsulosin, 0.4 mg, oral, Nightly      Continuous medications  lactated Ringer's, 75 mL/hr, Last Rate: 75 mL/hr (11/22/23 0240)      PRN medications  PRN medications: acetaminophen, ondansetron **OR** ondansetron               Assessment/Plan   Principal Problem:    Acute upper GI bleed    74 y/o M w/PMH of HTN, BPH, depression presenting w/1 day of diarrhea and black tarry stool, concern for acute upper GI bleed.    Acute GI bleed, possibly associated w/NSAID use  HTN  BPH  Hypothyroidism  Depression    Continue IV protonix; NPO, continue IVF  H/H has remained stable, no overt bleeding noted; last Bms normal  GI consult  Infectious colitis unlikely; will hold off on abx at this time  BP stable, home meds resumed  DVT ppx w/SCDs    Full Code       Victor M Hayes MD

## 2023-11-22 NOTE — ANESTHESIA POSTPROCEDURE EVALUATION
Patient: Ralf Garcia    Procedure Summary       Date: 11/22/23 Room / Location: [facility]    Anesthesia Start: 1426 Anesthesia Stop:     Procedure: EGD Diagnosis: GI bleed due to NSAIDs    Scheduled Providers: Ramon Kramer DO Responsible Provider: Edwin Story MD    Anesthesia Type: MAC ASA Status: 3            Anesthesia Type: MAC    Vitals Value Taken Time   BP 92/51 11/22/23 1442   Temp  11/22/23 1442   Pulse 60 11/22/23 1442   Resp 14 11/22/23 1442   SpO2 95% 11/22/23 1442       Anesthesia Post Evaluation    Patient location during evaluation: bedside  Patient participation: complete - patient participated  Level of consciousness: awake and alert  Pain score: 0  Pain management: adequate  Multimodal analgesia pain management approach  Airway patency: patent  Cardiovascular status: acceptable  Respiratory status: acceptable and nasal cannula  Hydration status: acceptable  Postoperative Nausea and Vomiting: none        No notable events documented.

## 2023-11-22 NOTE — CARE PLAN
The patient's goals for the shift include Just want to rest and sleep.    The clinical goals for the shift include Patient will have no more  dark diarrhia.    Over the shift, the patient did not make progress toward the following goals. Barriers to progression include  Recommendations to address these barriers include .

## 2023-11-22 NOTE — ANESTHESIA PREPROCEDURE EVALUATION
Ralf Garcia is a 73 y.o. male here for:    EGD  With * No providers found *  GI bleed due to NSAIDs    Relevant Problems   Cardiovascular   (+) HTN (hypertension)      Endocrine   (+) Class 1 obesity in adult   (+) Hypothyroidism      GI   (+) Acute upper GI bleed      Neuro/Psych   (+) Anxiety       Lab Results   Component Value Date    HGB 15.4 11/22/2023    HCT 45.4 11/22/2023    WBC 9.5 11/22/2023     11/22/2023     11/22/2023    K 3.8 11/22/2023     11/22/2023    CREATININE 1.09 11/22/2023    BUN 22 11/22/2023       Social History     Tobacco Use   Smoking Status Never   Smokeless Tobacco Never       Allergies   Allergen Reactions   • Codeine Other     Hallucinations   • Hydrocodone-Acetaminophen Other       Current Outpatient Medications   Medication Instructions   • aspirin 81 mg, oral, 2 times daily   • cholecalciferol (VITAMIN D-3) 1,000 Units, oral, Daily RT   • lansoprazole (Prevacid) 30 mg DR capsule 1 capsule, oral, Daily   • levothyroxine (SYNTHROID, LEVOXYL) 88 mcg, oral, Daily before breakfast   • magnesium oxide 500 mg tablet 1 tablet, oral, Nightly   • ramipril (ALTACE) 5 mg, oral, Daily   • sertraline (Zoloft) 100 mg tablet 1 tablet, oral, Nightly   • tamsulosin (Flomax) 0.4 mg 24 hr capsule 1 capsule, oral, Nightly       Past Surgical History:   Procedure Laterality Date   • CT ABDOMEN PELVIS ANGIOGRAM W AND/OR WO IV CONTRAST  11/21/2023    CT ABDOMEN PELVIS ANGIOGRAM W AND/OR WO IV CONTRAST 11/21/2023 [facility]   • KNEE SURGERY Left     miniscus tear   • TOTAL HIP ARTHROPLASTY Right        No family history on file.    NPO Details:  No data recorded    Physical Exam    Anesthesia Plan    ASA 3     MAC     The patient is not a current smoker.    intravenous induction   Anesthetic plan and risks discussed with patient.    Plan discussed with CRNA.

## 2023-11-22 NOTE — DISCHARGE SUMMARY
Discharge Diagnosis  Acute upper GI bleed    Issues Requiring Follow-Up      Test Results Pending At Discharge  Pending Labs       No current pending labs.            Hospital Course   73-year-old male with past medical history of anxiety, BPH, hypertension who presented to the ER with diarrhea and black tarry stools for 1 day.  Since coming to the hospital he has not had any bowel movements.  Hemoglobin has remained relatively stable, mild drop is most likely related to dilution since he has been receiving fluids.  Leukocytosis has resolved.  No abdominal pain, no dizziness or lightheadedness.  CT angiogram abdomen pelvis did not show any bleeding.  There was no signs of acute colitis either.  Patient is eager to go home.  I counseled him regarding avoiding NSAIDs, recommended to take Tylenol as needed for pain.  He will have an EGD later today and can be discharged post EGD.    Pertinent Physical Exam At Time of Discharge  Physical Exam  General Appearance: awake and alert, AAOx3, NAD  HEENT: nc/at, eomi, perrla, dry oral mucosa  Resp: ctab; no wheezing, rhonchi, or rales, normal respiratory effort  Cardio: rrr. S1s2  GI: soft, ntnd, BS+  Ext: no edema, 2+ pulses b/l  Home Medications     Medication List      CONTINUE taking these medications     aspirin 81 mg EC tablet   cholecalciferol 25 MCG (1000 UT) capsule; Commonly known as: Vitamin D-3   lansoprazole 30 mg DR capsule; Commonly known as: Prevacid   levothyroxine 88 mcg tablet; Commonly known as: Synthroid, Levoxyl   magnesium oxide 500 mg tablet   ramipril 5 mg capsule; Commonly known as: Altace   sertraline 100 mg tablet; Commonly known as: Zoloft   tamsulosin 0.4 mg 24 hr capsule; Commonly known as: Flomax       Outpatient Follow-Up  No future appointments.    Estiven Smith MD

## 2023-11-22 NOTE — NURSING NOTE
Patient tolerated procedure well. Appears comfortable with no complaints of pain. VS stable. Arousable prior to transport. Patient transported to room via transport  Report called to bedside RN. Handoff completed

## 2023-12-04 ENCOUNTER — ANCILLARY PROCEDURE (OUTPATIENT)
Dept: RADIOLOGY | Facility: CLINIC | Age: 73
End: 2023-12-04
Payer: MEDICARE

## 2023-12-04 ENCOUNTER — OFFICE VISIT (OUTPATIENT)
Dept: ORTHOPEDIC SURGERY | Facility: CLINIC | Age: 73
End: 2023-12-04
Payer: MEDICARE

## 2023-12-04 DIAGNOSIS — M79.672 LEFT FOOT PAIN: ICD-10-CM

## 2023-12-04 DIAGNOSIS — M25.572 ACUTE LEFT ANKLE PAIN: ICD-10-CM

## 2023-12-04 DIAGNOSIS — S93.402A MODERATE LEFT ANKLE SPRAIN, INITIAL ENCOUNTER: ICD-10-CM

## 2023-12-04 DIAGNOSIS — M77.52 TENDONITIS OF ANKLE, LEFT: ICD-10-CM

## 2023-12-04 PROCEDURE — 73610 X-RAY EXAM OF ANKLE: CPT | Mod: LEFT SIDE | Performed by: INTERNAL MEDICINE

## 2023-12-04 PROCEDURE — 1111F DSCHRG MED/CURRENT MED MERGE: CPT | Performed by: INTERNAL MEDICINE

## 2023-12-04 PROCEDURE — 73630 X-RAY EXAM OF FOOT: CPT | Mod: LEFT SIDE | Performed by: INTERNAL MEDICINE

## 2023-12-04 PROCEDURE — 73610 X-RAY EXAM OF ANKLE: CPT | Mod: LT

## 2023-12-04 PROCEDURE — 73630 X-RAY EXAM OF FOOT: CPT | Mod: LT,FY

## 2023-12-04 PROCEDURE — 1126F AMNT PAIN NOTED NONE PRSNT: CPT | Performed by: INTERNAL MEDICINE

## 2023-12-04 PROCEDURE — 1159F MED LIST DOCD IN RCRD: CPT | Performed by: INTERNAL MEDICINE

## 2023-12-04 PROCEDURE — 1036F TOBACCO NON-USER: CPT | Performed by: INTERNAL MEDICINE

## 2023-12-04 PROCEDURE — 99213 OFFICE O/P EST LOW 20 MIN: CPT | Performed by: INTERNAL MEDICINE

## 2023-12-04 PROCEDURE — 99203 OFFICE O/P NEW LOW 30 MIN: CPT | Performed by: INTERNAL MEDICINE

## 2023-12-04 RX ORDER — PREDNISONE 50 MG/1
50 TABLET ORAL DAILY
Qty: 5 TABLET | Refills: 0 | Status: SHIPPED | OUTPATIENT
Start: 2023-12-04 | End: 2023-12-09

## 2023-12-04 NOTE — PROGRESS NOTES
Acute Injury New Patient Visit    CC: No chief complaint on file.      HPI: Ralf is a 73 y.o. male presents today with acute left ankle injury.  He is injured his left ankle several times over the past 3 to 4 weeks.  And recently injured it while playing football during Thanksgiving.  He is wearing his fracture boot today which is helping with his pain discomfort.        Review of Systems   GENERAL: Negative for malaise, significant weight loss, fever  MUSCULOSKELETAL: See HPI  NEURO:  Negative for numbness / tingling     Past Medical History  Past Medical History:   Diagnosis Date    Anxiety     Enlarged prostate     Hypertension        Medication review  Medication Documentation Review Audit       Reviewed by Sudha Torres RN (Registered Nurse) on 11/21/23 at 2233      Medication Order Taking? Sig Documenting Provider Last Dose Status   aspirin 81 mg EC tablet [id number] Yes Take 1 tablet (81 mg) by mouth twice a day. Historical Provider, MD  Active   cholecalciferol (Vitamin D-3) 25 MCG (1000 UT) capsule [id number]  Take 1 capsule (25 mcg) by mouth once daily. Historical Provider, MD  Active   lansoprazole (Prevacid) 30 mg DR capsule [id number] Yes Take 1 capsule (30 mg) by mouth once daily. Historical Provider, MD  Active   levothyroxine (Synthroid, Levoxyl) 88 mcg tablet [id number] Yes Take 1 tablet (88 mcg) by mouth once daily in the morning. Take before meals. Historical Provider, MD  Active   magnesium oxide 500 mg tablet [id number] Yes Take 1 tablet (500 mg) by mouth once daily at bedtime. Historical Provider, MD  Active   ramipril (Altace) 5 mg capsule [id number] Yes Take 1 capsule (5 mg) by mouth once daily. Historical Provider, MD  Active   sertraline (Zoloft) 100 mg tablet [id number] Yes Take 1 tablet (100 mg) by mouth once daily at bedtime. Historical MD Ron  Active   tamsulosin (Flomax) 0.4 mg 24 hr capsule [id number] Yes Take 1 capsule (0.4 mg) by mouth once daily at bedtime. Historical MD Ron   Active                    Allergies  Allergies   Allergen Reactions    Codeine Other     Hallucinations    Hydrocodone-Acetaminophen Other       Social History  Social History     Socioeconomic History    Marital status:      Spouse name: Not on file    Number of children: Not on file    Years of education: Not on file    Highest education level: Not on file   Occupational History    Not on file   Tobacco Use    Smoking status: Never    Smokeless tobacco: Never   Substance and Sexual Activity    Alcohol use: Never    Drug use: Never    Sexual activity: Not on file   Other Topics Concern    Not on file   Social History Narrative    Not on file     Social Determinants of Health     Financial Resource Strain: Low Risk  (11/21/2023)    Overall Financial Resource Strain (CARDIA)     Difficulty of Paying Living Expenses: Not hard at all   Food Insecurity: Not on file   Transportation Needs: No Transportation Needs (11/21/2023)    PRAPARE - Transportation     Lack of Transportation (Medical): No     Lack of Transportation (Non-Medical): No   Physical Activity: Not on file   Stress: Not on file   Social Connections: Not on file   Intimate Partner Violence: Not on file   Housing Stability: Low Risk  (11/21/2023)    Housing Stability Vital Sign     Unable to Pay for Housing in the Last Year: No     Number of Places Lived in the Last Year: 1     Unstable Housing in the Last Year: No       Surgical History  Past Surgical History:   Procedure Laterality Date    CT ABDOMEN PELVIS ANGIOGRAM W AND/OR WO IV CONTRAST  11/21/2023    CT ABDOMEN PELVIS ANGIOGRAM W AND/OR WO IV CONTRAST 11/21/2023 [facility]    KNEE SURGERY Left     miniscus tear    TOTAL HIP ARTHROPLASTY Right        Physical Exam:  GENERAL:  Patient is awake, alert, and oriented to person place and time.  Patient appears well nourished and well kept.  Affect Calm, Not Acutely Distressed.  HEENT:  Normocephalic, Atraumatic, EOMI  CARDIOVASCULAR:  Hemodynamically  stable.  RESPIRATORY:  Normal respirations with unlabored breathing.  Extremity: Left foot ankle shows skin is intact.  No obvious swelling or deformity.  There is no pain of the lateral or medial malleolus.  Negative Bragg's test.  Achilles tendon is intact.  Some mild pain of the Achilles tendon near its insertion.  Mild pain with ATF and CF ligament.  There is no pain over the PTF ligament.  There is no pain over the deltoid ligament.  There is no pain of the base of the fifth metatarsal bone.  Negative anterior drawer test.  Negative squeeze test.  Right foot and ankle exam for comparison.      Diagnostics: X-rays reviewed      Procedure: None    Assessment:  1.  Left ankle sprain  2.  Left ankle tendinitis    Plan: Ralf Saini left ankle pain secondary to multiple foot and ankle trauma over the past several weeks.  X-ray shows no obvious fractures.  We do recommend continued fracture boot we will also place in a short course of prednisone 50 mg for 5 days.  He will follow-up in 2 weeks and we will reevaluate his foot and ankle, if he is clinically doing well we may consider getting him into a lace up ankle brace and possibly some physical therapy.  If there is no improvement we may consider further imaging.    Orders Placed This Encounter    XR ankle left 3+ views    XR foot left 3+ views    predniSONE (Deltasone) 50 mg tablet      At the conclusion of the visit there were no further questions by the patient/family regarding their plan of care.  Patient was instructed to call or return with any issues, questions, or concerns regarding their injury and/or treatment plan described above.     12/04/23 at 3:16 PM - Brandt Mayo MD    Office: [phone]    This note was prepared using voice recognition software.  The details of this note are correct and have been reviewed, and corrected to the best of my ability.  Some grammatical errors may persist related to the Dragon software.

## 2023-12-05 LAB
LABORATORY COMMENT REPORT: NORMAL
PATH REPORT.FINAL DX SPEC: NORMAL
PATH REPORT.GROSS SPEC: NORMAL
PATH REPORT.RELEVANT HX SPEC: NORMAL
PATH REPORT.TOTAL CANCER: NORMAL

## 2023-12-13 ENCOUNTER — APPOINTMENT (OUTPATIENT)
Dept: GASTROENTEROLOGY | Facility: CLINIC | Age: 73
End: 2023-12-13
Payer: MEDICARE

## 2023-12-19 ENCOUNTER — OFFICE VISIT (OUTPATIENT)
Dept: ORTHOPEDIC SURGERY | Facility: CLINIC | Age: 73
End: 2023-12-19
Payer: MEDICARE

## 2023-12-19 DIAGNOSIS — S93.402A SPRAIN OF LEFT ANKLE, UNSPECIFIED LIGAMENT, INITIAL ENCOUNTER: ICD-10-CM

## 2023-12-19 DIAGNOSIS — M77.52 LEFT ANKLE TENDINITIS: ICD-10-CM

## 2023-12-19 PROCEDURE — 1111F DSCHRG MED/CURRENT MED MERGE: CPT | Performed by: INTERNAL MEDICINE

## 2023-12-19 PROCEDURE — 1036F TOBACCO NON-USER: CPT | Performed by: INTERNAL MEDICINE

## 2023-12-19 PROCEDURE — 99213 OFFICE O/P EST LOW 20 MIN: CPT | Performed by: INTERNAL MEDICINE

## 2023-12-19 PROCEDURE — 1159F MED LIST DOCD IN RCRD: CPT | Performed by: INTERNAL MEDICINE

## 2023-12-19 PROCEDURE — 1126F AMNT PAIN NOTED NONE PRSNT: CPT | Performed by: INTERNAL MEDICINE

## 2023-12-19 NOTE — PROGRESS NOTES
CC:   Chief Complaint   Patient presents with    Left Ankle - Follow-up     Left ankle sprain,  Left ankle tendinitis         HPI: Ralf is a 73 y.o. male presents today for follow-up for left ankle pain secondary to ankle sprain and ankle tendinitis.  He completed the oral steroids, he is feeling much better today.  No new issues.        Review of Systems   GENERAL: Negative for malaise, significant weight loss, fever  MUSCULOSKELETAL: See HPI  NEURO:  Negative for numbness / tingling     Past Medical History  Past Medical History:   Diagnosis Date    Anxiety     Enlarged prostate     Hypertension        Medication review  Medication Documentation Review Audit       Reviewed by Melissa Brunner, MA (Medical Assistant) on 23 at 1040      Medication Order Taking? Sig Documenting Provider Last Dose Status   aspirin 81 mg EC tablet [id number]  Take 1 tablet (81 mg) by mouth twice a day. Charles Velasquez MD  Active   cholecalciferol (Vitamin D-3) 25 MCG (1000 UT) capsule [id number]  Take 1 capsule (25 mcg) by mouth once daily. Charles Velasquez MD  Active   levothyroxine (Synthroid, Levoxyl) 88 mcg tablet [id number]  Take 1 tablet (88 mcg) by mouth once daily in the morning. Take before meals. Charles Velasquez MD  Active   magnesium oxide 500 mg tablet [id number]  Take 1 tablet (500 mg) by mouth once daily at bedtime. Charles Velasquez MD  Active   pantoprazole (ProtoNix) 40 mg EC tablet [id number]  Take 1 tablet (40 mg) by mouth 2 times a day before meals. Do not crush, chew, or split. Estiven Smith MD  Active   ramipril (Altace) 5 mg capsule [id number]  Take 1 capsule (5 mg) by mouth once daily. Historical MD Ron   23 2823   sertraline (Zoloft) 100 mg tablet [id number]  Take 1 tablet (100 mg) by mouth once daily at bedtime. Charles Velasquez MD  Active   tamsulosin (Flomax) 0.4 mg 24 hr capsule [id number]  Take 1 capsule (0.4 mg) by mouth once daily at bedtime. Historical  Provider, MD  Active                    Allergies  Allergies   Allergen Reactions    Codeine Other     Hallucinations    Hydrocodone-Acetaminophen Other       Social History  Social History     Socioeconomic History    Marital status:      Spouse name: Not on file    Number of children: Not on file    Years of education: Not on file    Highest education level: Not on file   Occupational History    Not on file   Tobacco Use    Smoking status: Never    Smokeless tobacco: Never   Substance and Sexual Activity    Alcohol use: Never    Drug use: Never    Sexual activity: Not on file   Other Topics Concern    Not on file   Social History Narrative    Not on file     Social Determinants of Health     Financial Resource Strain: Low Risk  (11/21/2023)    Overall Financial Resource Strain (CARDIA)     Difficulty of Paying Living Expenses: Not hard at all   Food Insecurity: Not on file   Transportation Needs: No Transportation Needs (11/21/2023)    PRAPARE - Transportation     Lack of Transportation (Medical): No     Lack of Transportation (Non-Medical): No   Physical Activity: Not on file   Stress: Not on file   Social Connections: Not on file   Intimate Partner Violence: Not on file   Housing Stability: Low Risk  (11/21/2023)    Housing Stability Vital Sign     Unable to Pay for Housing in the Last Year: No     Number of Places Lived in the Last Year: 1     Unstable Housing in the Last Year: No       Surgical History  Past Surgical History:   Procedure Laterality Date    CT ABDOMEN PELVIS ANGIOGRAM W AND/OR WO IV CONTRAST  11/21/2023    CT ABDOMEN PELVIS ANGIOGRAM W AND/OR WO IV CONTRAST 11/21/2023 [facility]    KNEE SURGERY Left     miniscus tear    TOTAL HIP ARTHROPLASTY Right        Physical Exam:  GENERAL:  Patient is awake, alert, and oriented to person place and time.  Patient appears well nourished and well kept.  Affect Calm, Not Acutely Distressed.  HEENT:  Normocephalic, Atraumatic, EOMI  CARDIOVASCULAR:   Hemodynamically stable.  RESPIRATORY:  Normal respirations with unlabored breathing.  Extremity: Left foot and ankle shows skin is intact.  No obvious swelling, no erythema warmth.  There is no pain of the lateral or medial malleolus.  There is no pain of the deltoid ligament.  There is no pain of the ATF, CF or PTF ligament.  Negative anterior drawer test.  Negative talar tilt test.  Negative squeeze test.  Negative Bragg's test.  He is walking with no significant antalgic gait.  Right foot and ankle was examined for comparison.      Diagnostics: X-rays reviewed  XR ankle left 3+ views  Interpreted By:  Brandt Henley,   STUDY:  XR ANKLE LEFT 3+ VIEWS;  ;  12/4/2023 1:48 pm      INDICATION:  Signs/Symptoms:Pain.      ACCESSION NUMBER(S):  [id number]      ORDERING CLINICIAN:  BRANDT HENLEY      FINDINGS:  Left ankle x-rays three views AP, lateral and oblique view: No acute  fractures, no dislocation. Mortise intact.          Signed by: Brandt Henley 12/4/2023 4:39 PM  Dictation workstation:   [id number]  XR foot left 3+ views  Interpreted By:  Brandt Henley,   STUDY:  XR FOOT LEFT 3+ VIEWS;  ;  12/4/2023 1:48 pm      INDICATION:  Signs/Symptoms:Pain.      ACCESSION NUMBER(S):  [id number]      ORDERING CLINICIAN:  BRANDT HENLEY      FINDINGS:  Left foot x-rays three views AP, lateral and oblique view: No acute  fractures, no dislocation. Mild midfoot degenerative changes.          Signed by: Brandt Henley 12/4/2023 4:38 PM  Dictation workstation:   [id number]        Procedure: None    Assessment:  1.  Left ankle sprain  2.  Left ankle tendinitis    Plan: Ralf presents for follow-up for left ankle pain secondary to ankle sprain and tendinitis.  He has responded well to the oral steroids and fracture boot.  We did recommend to discontinue fracture boot and transition to a lace up ankle brace and well supported shoes.  Will get him involved with some physical therapy and have him follow-up as  needed.    Orders Placed This Encounter    Referral to Physical Therapy      At the conclusion of the visit there were no further questions by the patient/family regarding their plan of care.  Patient was instructed to call or return with any issues, questions, or concerns regarding their injury and/or treatment plan described above.     12/19/23 at 11:22 AM - Brandt Mayo MD    Office: [phone]    This note was prepared using voice recognition software.  The details of this note are correct and have been reviewed, and corrected to the best of my ability.  Some grammatical errors may persist related to the Dragon software.

## 2024-08-22 ENCOUNTER — HOSPITAL ENCOUNTER (EMERGENCY)
Facility: HOSPITAL | Age: 74
Discharge: HOME | End: 2024-08-22
Payer: MEDICARE

## 2024-08-22 VITALS
RESPIRATION RATE: 18 BRPM | DIASTOLIC BLOOD PRESSURE: 83 MMHG | TEMPERATURE: 97.9 F | SYSTOLIC BLOOD PRESSURE: 131 MMHG | HEIGHT: 72 IN | HEART RATE: 81 BPM | WEIGHT: 240 LBS | BODY MASS INDEX: 32.51 KG/M2 | OXYGEN SATURATION: 96 %

## 2024-08-22 DIAGNOSIS — S69.90XA FISH HOOK IN FINGER: Primary | ICD-10-CM

## 2024-08-22 PROCEDURE — 99283 EMERGENCY DEPT VISIT LOW MDM: CPT | Mod: 25

## 2024-08-22 PROCEDURE — 2500000001 HC RX 250 WO HCPCS SELF ADMINISTERED DRUGS (ALT 637 FOR MEDICARE OP): Performed by: PHYSICIAN ASSISTANT

## 2024-08-22 PROCEDURE — 10120 INC&RMVL FB SUBQ TISS SMPL: CPT | Mod: F1 | Performed by: PHYSICIAN ASSISTANT

## 2024-08-22 RX ORDER — CEPHALEXIN 500 MG/1
500 CAPSULE ORAL 4 TIMES DAILY
Qty: 28 CAPSULE | Refills: 0 | Status: SHIPPED | OUTPATIENT
Start: 2024-08-22 | End: 2024-08-29

## 2024-08-22 RX ORDER — BACITRACIN 500 [USP'U]/G
OINTMENT TOPICAL ONCE
Status: COMPLETED | OUTPATIENT
Start: 2024-08-22 | End: 2024-08-22

## 2024-08-22 RX ORDER — CEPHALEXIN 500 MG/1
500 CAPSULE ORAL ONCE
Status: COMPLETED | OUTPATIENT
Start: 2024-08-22 | End: 2024-08-22

## 2024-08-22 RX ADMIN — BACITRACIN: 500 OINTMENT TOPICAL at 13:44

## 2024-08-22 RX ADMIN — CEPHALEXIN 500 MG: 500 CAPSULE ORAL at 13:48

## 2024-08-22 ASSESSMENT — LIFESTYLE VARIABLES
TOTAL SCORE: 0
EVER HAD A DRINK FIRST THING IN THE MORNING TO STEADY YOUR NERVES TO GET RID OF A HANGOVER: NO
EVER FELT BAD OR GUILTY ABOUT YOUR DRINKING: NO
HAVE PEOPLE ANNOYED YOU BY CRITICIZING YOUR DRINKING: NO
HAVE YOU EVER FELT YOU SHOULD CUT DOWN ON YOUR DRINKING: NO

## 2024-08-22 ASSESSMENT — PAIN - FUNCTIONAL ASSESSMENT: PAIN_FUNCTIONAL_ASSESSMENT: 0-10

## 2024-08-22 ASSESSMENT — PAIN SCALES - GENERAL
PAINLEVEL_OUTOF10: 0 - NO PAIN
PAINLEVEL_OUTOF10: 0 - NO PAIN

## 2024-08-22 NOTE — ED PROVIDER NOTES
HPI   Chief Complaint   Patient presents with    Foreign Body     Fish hook in  left hand       This 74-year-old male coming in for left hand fishhook in the finger.  The fishhook was used already.  It was dirty.  His tetanus was updated in 2019.  No other areas of injury.      History provided by:  Patient          Patient History   Past Medical History:   Diagnosis Date    Anxiety     Enlarged prostate     Hypertension      Past Surgical History:   Procedure Laterality Date    CT ABDOMEN PELVIS ANGIOGRAM W AND/OR WO IV CONTRAST  11/21/2023    CT ABDOMEN PELVIS ANGIOGRAM W AND/OR WO IV CONTRAST 11/21/2023 [facility]    KNEE SURGERY Left     miniscus tear    TOTAL HIP ARTHROPLASTY Right      No family history on file.  Social History     Tobacco Use    Smoking status: Never    Smokeless tobacco: Never   Substance Use Topics    Alcohol use: Never    Drug use: Never       Physical Exam   ED Triage Vitals [08/22/24 1258]   Temperature Heart Rate Respirations BP   36.6 °C (97.9 °F) 85 16 136/75      Pulse Ox Temp src Heart Rate Source Patient Position   96 % -- -- --      BP Location FiO2 (%)     -- --       Physical Exam  Vitals and nursing note reviewed.   Constitutional:       General: He is not in acute distress.     Appearance: Normal appearance. He is not ill-appearing or toxic-appearing.   HENT:      Head: Normocephalic and atraumatic.   Eyes:      Extraocular Movements: Extraocular movements intact.      Conjunctiva/sclera: Conjunctivae normal.      Pupils: Pupils are equal, round, and reactive to light.   Cardiovascular:      Rate and Rhythm: Regular rhythm.      Pulses: Normal pulses.      Heart sounds: Normal heart sounds.   Pulmonary:      Effort: Pulmonary effort is normal. No respiratory distress.      Breath sounds: Normal breath sounds.   Abdominal:      General: Abdomen is flat. There is no distension.   Musculoskeletal:         General: Normal range of motion.      Cervical back: Normal range of motion and  neck supple.   Skin:     General: Skin is warm and dry.      Comments: [place] foreign body present in left hand second finger.  No other areas of involvement.   Neurological:      General: No focal deficit present.      Mental Status: He is alert and oriented to person, place, and time.   Psychiatric:         Mood and Affect: Mood normal.         Behavior: Behavior normal.         Thought Content: Thought content normal.           ED Course & MDM   Diagnoses as of 08/22/24 1330   Fish hook in finger                 No data recorded     Miles Coma Scale Score: 15 (08/22/24 1301 : Precious Mabry RN)                           Medical Decision Making  Summary:  Medical Decision Making:   Patient presented as described in HPI. Patient case including ROS, PE, and treatment and plan discussed with ED attending if attached as cosigner. Results from labs and or imaging included below if completed. Ralf Garcia  is a 74 y.o. coming in for Patient presents with:  Foreign Body: Fish hook in  left hand  .  The area was anesthetized using 1% lidocaine.  The fishhook was removed using technique of hemostats as well as 18-gauge needle covering the nick and pulled back.  This was successfully completed.  Patient's tetanus again is up-to-date reviewing records and was updated in 2019.  He is advised on wound care monitoring such as looking for signs of infection that was explained to the patient.  He will be prophylactically treated with Keflex due to the fishhook being used and being dirty.  Advised on applying antibiotic ointment to the area 3 times a day.  Follow-up with his PCP but if he develops any concerning signs or symptoms of infection return for repeat evaluation.      Disposition is completed with shared decision making with the patient or guardian present with the patient. They were advised to follow up with PCP or recommended provider in 2-3 days for another evaluation and exam. I advised the patient to  return or go to closest emergency room immediately if symptoms change, get worse, or new symptoms develop prior to follow up. I explained the plan and treatment course. Patient/guardian is in agreement with plan, treatment course, and follow up and state that they will comply.    Labs Reviewed - No data to display   No orders to display                         Tests/Medications/Escalations of Care considered but not given: As in MDM    Patient care discussed with: N/A  Social Determinants affecting care: N/A    Final diagnosis and disposition as documented     Diagnoses as of 08/22/24 1332  Fish hook in finger       Shared decision making was completed and determined that patient will be discharged. I discussed the differential; results and discharge plan with the patient and/or family/friend/caregiver if present.  I emphasized the importance of follow-up with the physician I referred them to in the timeframe recommended.  I explained reasons for the patient to return to the Emergency Department. They agreed that if they feel their condition is worsening or if they have any other concern they should call 911 immediately for further assistance. I gave the patient an opportunity to ask all questions they had and answered all of them accordingly. They understand return precautions and discharge instructions. The patient and/or family/friend/caregiver expressed understanding verbally and that they would comply.     Disposition: Discharge      This note has been transcribed using voice recognition and may contain grammatical errors, misplaced words, incorrect words, incorrect phrases or other errors.         Procedure  Foreign Body Removal - Embedded    Performed by: Manfred Bustamante PA-C  Authorized by: Manfred Bustamante PA-C    Consent:     Consent obtained:  Verbal    Consent given by:  Patient  Universal protocol:     Patient identity confirmed:  Verbally with patient  Location:     Location:  Finger    Finger location:   L index finger    Depth:  Subcutaneous    Tendon involvement:  None  Pre-procedure details:     Imaging:  None    Neurovascular status: intact    Anesthesia:     Anesthesia method:  Local infiltration    Local anesthetic:  Lidocaine 1% w/o epi  Procedure type:     Procedure complexity:  Simple  Procedure details:     Removal mechanism:  Hemostat    Foreign bodies recovered:  1    Description:  [place]    Intact foreign body removal: yes    Post-procedure details:     Neurovascular status: intact      Confirmation:  No additional foreign bodies on visualization    Skin closure:  None    Dressing:  Antibiotic ointment    Procedure completion:  Tolerated       Manfred Bustamante PA-C  08/22/24 4627